# Patient Record
Sex: MALE | Race: OTHER | Employment: UNEMPLOYED | URBAN - METROPOLITAN AREA
[De-identification: names, ages, dates, MRNs, and addresses within clinical notes are randomized per-mention and may not be internally consistent; named-entity substitution may affect disease eponyms.]

---

## 2018-01-30 ENCOUNTER — APPOINTMENT (OUTPATIENT)
Dept: GENERAL RADIOLOGY | Age: 33
End: 2018-01-30
Attending: EMERGENCY MEDICINE
Payer: SELF-PAY

## 2018-01-30 ENCOUNTER — HOSPITAL ENCOUNTER (EMERGENCY)
Age: 33
Discharge: HOME | End: 2018-01-30
Attending: EMERGENCY MEDICINE
Payer: SELF-PAY

## 2018-01-30 VITALS
TEMPERATURE: 98.2 F | HEART RATE: 70 BPM | DIASTOLIC BLOOD PRESSURE: 80 MMHG | SYSTOLIC BLOOD PRESSURE: 134 MMHG | OXYGEN SATURATION: 98 % | WEIGHT: 160 LBS | RESPIRATION RATE: 18 BRPM

## 2018-01-30 DIAGNOSIS — R05.9 COUGH: Primary | ICD-10-CM

## 2018-01-30 LAB
FLUAV AG NPH QL IA: NEGATIVE
FLUBV AG NPH QL IA: NEGATIVE

## 2018-01-30 PROCEDURE — 87804 INFLUENZA ASSAY W/OPTIC: CPT | Performed by: EMERGENCY MEDICINE

## 2018-01-30 PROCEDURE — 99283 EMERGENCY DEPT VISIT LOW MDM: CPT | Performed by: EMERGENCY MEDICINE

## 2018-01-30 PROCEDURE — 71046 X-RAY EXAM CHEST 2 VIEWS: CPT

## 2018-01-30 RX ORDER — DOXYCYCLINE HYCLATE 100 MG
100 TABLET ORAL 2 TIMES DAILY
Qty: 20 TAB | Refills: 0 | Status: SHIPPED | OUTPATIENT
Start: 2018-01-30 | End: 2018-01-30

## 2018-01-30 RX ORDER — DOXYCYCLINE HYCLATE 100 MG
100 TABLET ORAL 2 TIMES DAILY
Qty: 20 TAB | Refills: 0 | Status: SHIPPED | OUTPATIENT
Start: 2018-01-30 | End: 2018-02-09

## 2018-01-31 NOTE — DISCHARGE INSTRUCTIONS
Tos: Instrucciones de cuidado - [ Cough: Care Instructions ]  Instrucciones de cuidado    La tos es [place] de alfred cuerpo a algo que molesta en la garganta o las vías respiratorias. La tos puede ser provocada por muchas cosas. Usted podría toser debido a un resfriado o madalyn gripe, madalyn bronquitis o el asma. Fumar, el goteo posnasal, las alergias y el ácido estomacal que regresa a alfred garganta también pueden causar tos. La tos es un síntoma, no madalyn enfermedad. En la [employer], la tos cesa cuando desaparece la causa, dylan un resfriado. Puede ladarius algunas medidas en alfred hogar para toser menos y sentirse mejor. La atención de seguimiento es madalyn parte clave de alfred tratamiento y seguridad. Asegúrese de hacer y acudir a todas las citas, y llame a alfred médico si está teniendo problemas. También es madalyn buena idea saber los resultados de los exámenes y mantener madalyn lista de los medicamentos que thomas. ¿Cómo puede cuidarse en el hogar? · Nunu abundante agua y otros líquidos. [place] ayuda a [place] mucosidad sea menos espesa y [place] la garganta seca o adolorida. La miel o el jugo de aftab en [place] o té podrían aliviar madalyn tos seca. · [employer] medicamentos para la tos según las indicaciones de alfred médico.  · Eleve la yobany sobre almohadas para ayudarle a respirar y aliviar la tos seca. · Pruebe pastillas para la tos para aliviar la garganta seca o adolorida. Las pastillas para la tos no detienen la tos. Las pastillas para la tos medicinales saborizadas no son mejores que las pastillas con sabor a emperatriz o los caramelos duros. · No fume. Evite el humo de tabaco ambiental. Si necesita ayuda para dejar de fumar, hable con alfred médico sobre programas y medicamentos para dejar de fumar. Estos pueden aumentar thu probabilidades de dejar el hábito para siempre. ¿Cuándo debe pedir ayuda? Llame al 911 en cualquier momento que considere que necesita atención de [place]. Por ejemplo, llame si:  ?  · Tiene graves dificultades para respirar. ? Llame a white médico ahora mismo o busque atención médica inmediata si:  ? · Tose danielle. ? · Tiene nuevas dificultades para respirar o Baudilio Mom. ? · Tiene fiebre nueva o más marilynn. ? · Tiene un salpullido nuevo. ?Preste especial atención a los cambios en white [place] y asegúrese de comunicarse con white médico si:  ? · White tos es más profunda o más frecuente que antes, especialmente si nota más mucosidad o un cambio en el color de la mucosidad. ? · Tiene nuevos síntomas, dylan dolor de garganta, dolor de oídos o dolor en los senos paranasales. ? · No mejora dylan se esperaba. ¿Dónde puede encontrar más información en inglés? Halima Hays a [email or web address] Escriba D279 en la búsqueda para aprender más acerca de \"Tos: Instrucciones de cuidado - [ Cough: Care Instructions ]. \"  Revisado: 12 gil, 2017  Versión del contenido: 11.4  © 9274-4927 Healthwise, Incorporated. Las instrucciones de cuidado fueron adaptadas bajo licencia por [employer] (which disclaims liability or warranty for this information). Si usted tiene [place] afección médica o sobre estas instrucciones, siempre pregunte a white profesional de [place]. Healthwise, Incorporated niega toda garantía o responsabilidad por white uso de esta información. Doxiciclina (Por la boca)   Se [place] para tratar y [address] infecciones. También se [place] para prevenir la malaria y tratar la rosácea o el acné grave. Pertenece a la clase de antibióticos derivados de la tetraciclina. [place]elia(s) : Acticlate, Adoxa, Adoxa Suleiman 1/150, Avidoxy, Avidoxy DK, BenzoDox 30 Kit, BenzoDox 60 Kit, Doryx, Doryx MPC, Monodox, Morgidox [id number], Morgidox [id number] Kit, Morgidox 1x50MG, Morgidox 1x50MG Kit, Morgidox [id number]   Existen muchas otras [address]. Jaida medicamento no debe ser usado cuando:   Jaida medicamento no es adecuado para todas las personas.  No lo use si ha tenido madalyn reacción alérgica a la doxiciclina o a otro antibiótico derivado de la tetraciclina, si está embarazada o dando de lactar. [place] de usar jaida medicamento:   [facility] de liberación [place], [place] de liberación prolongada, [place], [place], Tableta de liberación programada  · Alfred médico le indicara cuanto medicamento necesita usar. No use más medicamento de lo indicado. · Pregunte a alfred médico o farmacéutico si usted debe ladarius [employer] [place] [employer] con o sin alimentos. Algunas de las formas que se presenta jaida medicamento pueden tomarse con alimentos o con [place], lauren otras formas deben tomarse con el estómago vacío. · Cápsulas Oracea®: Jaida medicamento debe tomarse con el estómago vacío, por lo menos 1 hora antes o 2 horas después de la comida. · Cápsula: Tráguela entera. No la quiebre, triture, mastique ni alla. · Tabletas de liberación prolongada: También puede ladarius jaida medicamento Brijesh trozos de la tableta con puré de manzana a temperatura ambiente Ingiera la mezcla inmediatamente. No la mastique. No la guarde para usarla más adelante. Puede ladarius esta medicina con comida o leche para evitar el malestar estomacal.  · Solución oral: Agite el envase dania antes de cada uso. Mida el líquido oral con [place], [place] para uso oral o taza especialmente marcadas para medir medicamentos. · Tabletas: Usted puede ladarius las tabletas con comida o con leche para evitar la irritación estomacal. Para partir madalyn tableta, tómela con el pulgar y [facility] cerca de la [place]. Luego, aplique suficiente presión dylan para quebrar la [employer]. No use la tableta si no se quiebra en la [place]. · [place] todo alfred medicamento recetado para eliminar alfred infección por completo aunque usted se sienta mejor después de las primeras dosis. · Si usted thomas la cápsula o tableta, ingiera suficiente líquido para evitar problemas de garganta.   · Prevención de la malaria: Comience a ladarius jaida medicamento 1 o 2 walter antes de salir de viaje. [employer] todos los días anisa el viaje. Continúe tomándolo anisa 4 semanas después de regresar. No use, sin embargo, connor medicamento por más de 4 meses. · No tome connor medicamento por más de 9 meses si lo está usando para tratar la rosácea. · Use sólo la pearl del medicamento que el médico le ha prescrito. Es posible que otras marcas no tengan el mismo [place]. · [place] y siga las instrucciones para el paciente que vienen con el medicamento. Hable con alfred médico o farmacéutico si tiene alguna pregunta. · Si olvida madalyn dosis: Si olvida madalyn dosis de alfred medicamento, tómelo lo más pronto posible. Si es irene la hora para alfred próxima dosis, espere hasta entonces para ladarius alfred dosis regular. No use medicamento adicional para reponer la dosis olvidada. · Guarde el medicamento en un recipiente cerrado a temperatura ambiente y alejado del calor, la humedad y la nayan directa. No congele la solución oral.  Medicamentos y alimentos que debe evitar:   Consulte con alfred médico o farmacéutico antes de usar cualquier otro medicamento, incluidos los de [place], las vitaminas y los productos herbales. · Algunos medicamento pueden afectar la eficacia de la doxiciclina.  Informe a alfred médico si está usando cualquiera de los siguientes:  ¨ Subsalicilato de bismuto  ¨ Medicamentos para el acné (incluyendo isotretinoína)  ¨ Las pastillas anticonceptivas  ¨ Anticoagulantes (incluyendo warfarina)  ¨ Medicamentos para las convulsiones (incluyendo [place], [place], fenitoína)  ¨ Medicamentos que contengan aluminio, calcio o anita (incluyendo un antiácido o suplementos vitamínicos)  ¨ Medicamentos para tratar la psoriasis (incluyendo acitretina)  ¨ Antibióticos de penicilina  ¨ Medicamentos para el estómago  Precauciones anisa el uso de connor medicamento:   · Connor medicamento puede causar malformaciones congénitas en el bebé si anisa la taryn o el embarazo alguno de [place] usando Dez. Infórmele a alfred médico de inmediato si alfred mir o usted [place] Es posible que las píldoras anticonceptivas no tengan la misma eficacia cuando se utilizan junto con Dueñas. Use un jenna método anticonceptivo para evitar el embarazo. · Informe a alfred médico si tiene madalyn enfermedad renal o hepática, asma o alergia a los sulfitos. Informe a alfred médico si ha tenido [place] and [place] o si tiene antecedentes de infecciones por hongos. · Connor medicamento puede causar los siguientes problemas:  ¨ Cambio permanente del color de los dientes (en niños menores de 8 años)  ¨ Aumento de presión dentro de la yobany  ¨ Infección por hongos  ¨ Problemas del sistema inmunitario  · Connor medicamento puede causar diarrea. Llame a alfred médico si la diarrea se intensifica, no se detiene, o contiene danielle. No tome ningún medicamento para suspender la diarrea hasta que hable con alfred médico. La diarrea puede ocurrir 2 meces o más después de suspender el uso de Dueñas. · Connor medicamento puede hacer que alfred piel se vuelva más sensible a la nayan solar. Use protector solar. No use lámparas ni cámaras bronceadoras. · Dígale a todo médico o dentista encargado de atenderle que usted está usando Dueñas. Puede que connor medicamento afecte algunos resultados de [place]. · Llame a alfred médico si thu síntomas no mejoran, o si Alannaylyfaustino Silva. · El médico solicitará exámenes de laboratorio anisa las citas de rutina para revisar los efectos de Dez. Asista a todas thu citas. · Guarde todos los medicamentos fuera del alcance de los niños. Nunca comparta thu medicamentos con [facility].   Efectos secundarios que pueden presentarse anisa el uso de connor medicamento:   Consulte inmediatamente con el médico si nota cualquiera de estos efectos secundarios:  · Reacción alérgica: Comezón o ronchas, hinchazón del john o las patricio, hinchazón u hormigueo en la boca o garganta, opresión en el pecho, dificultad para respirar  · Ampollas, despellejamiento, sarpullido kaplan en la piel. · Ardor, dolor o irritación en la parte superior del estómago o la garganta  · Diarrea con posible presencia de danielle  · Adan Sax, tos, flujo o congestión nasal, dolor de garganta, aamir de cuerpo  · [employer], fiebre, salpullido y cansancio o debilidad inusuales  · Dolor de yobany intenso, mareos o cambios en la vista  · Dolor de estómago súbito e intenso, náuseas, vómito, desvanecimientos  Consulte con el médico si nota los siguientes efectos secundarios menos graves:   · Oscurecimiento de la piel, las cicatrices, los dientes o las encías  · Ampollas o manchas flaco en la boca, labios o garganta  Consulte con el médico si nota otros efectos secundarios que jesus son causados por connor medicamento. Llame a alfred médico para consultarle Sunil. Usted puede notificar thu efectos secundarios al FDA al [phone]. © 2017 [address]  Information is for End User's use only and may not be sold, redistributed or otherwise used for commercial purposes. Esta información es sólo para uso en educación. Alfred intención no es darle un consejo médico sobre enfermedades o tratamientos. Colsulte con alfred [place] Stagers farmacéutico antes de seguir cualquier régimen médico para saber si es seguro y efectivo para usted.

## 2018-01-31 NOTE — ED PROVIDER NOTES
HPI Comments: Patient is a 28 yo male with cough. Patient states cough for about 1 week with runny nose, congestion and sore throat. States cough non-productive. States fevers initially which have improved the past couple days. NO chest pain, no SOB, no abdominal pain, no nausea or vomiting, no further complaints. Overall patient very well appearing, NAD. Patient is a 29 y.o. male presenting with cough. The history is provided by the patient. The history is limited by a language barrier. A  was used. Cough   Pertinent negatives include no chest pain, no chills, no headaches, no rhinorrhea, no sore throat, no shortness of breath, no nausea and no vomiting. No past medical history on file. No past surgical history on file. No family history on file. Social History     Social History    Marital status:      Spouse name: N/A    Number of children: N/A    Years of education: N/A     Occupational History    Not on file. Social History Main Topics    Smoking status: Not on file    Smokeless tobacco: Not on file    Alcohol use Not on file    Drug use: Not on file    Sexual activity: Not on file     Other Topics Concern    Not on file     Social History Narrative         ALLERGIES: Review of patient's allergies indicates no known allergies. Review of Systems   Constitutional: Negative for chills and fever. HENT: Negative for rhinorrhea and sore throat. Eyes: Negative for visual disturbance. Respiratory: Positive for cough. Negative for shortness of breath. Cardiovascular: Negative for chest pain and leg swelling. Gastrointestinal: Negative for abdominal pain, diarrhea, nausea and vomiting. Genitourinary: Negative for dysuria. Musculoskeletal: Negative for back pain and neck pain. Skin: Negative for rash. Neurological: Negative for weakness and headaches. Psychiatric/Behavioral: The patient is not nervous/anxious.         Vitals: 01/30/18 1902   BP: 133/82   Pulse: 68   Resp: 18   Temp: 98.4 °F (36.9 °C)   SpO2: 98%   Weight: 72.6 kg (160 lb)            Physical Exam   Constitutional: He is oriented to person, place, and time. He appears well-developed and well-nourished. HENT:   Head: Normocephalic. Right Ear: External ear normal.   Left Ear: External ear normal.   Eyes: Conjunctivae and EOM are normal. Pupils are equal, round, and reactive to light. Neck: Normal range of motion. Neck supple. No tracheal deviation present. Cardiovascular: Normal rate, regular rhythm, normal heart sounds and intact distal pulses. No murmur heard. Pulmonary/Chest: Effort normal and breath sounds normal. No respiratory distress. He has no wheezes. He has no rales. Abdominal: Soft. He exhibits no distension. There is no tenderness. There is no rebound. Musculoskeletal: Normal range of motion. Neurological: He is alert and oriented to person, place, and time. No cranial nerve deficit. Skin: No rash noted. Nursing note and vitals reviewed. MDM  Number of Diagnoses or Management Options  Cough: new and requires workup     Amount and/or Complexity of Data Reviewed  Clinical lab tests: ordered and reviewed  Tests in the radiology section of CPT®: ordered and reviewed    Risk of Complications, Morbidity, and/or Mortality  Presenting problems: moderate  Diagnostic procedures: moderate  Management options: moderate    Patient Progress  Patient progress: stable        ED Course       Procedures     Recent Results (from the past 12 hour(s))   INFLUENZA A & B AG (RAPID TEST)    Collection Time: 01/30/18  7:10 PM   Result Value Ref Range    Influenza A Ag NEGATIVE  NEG      Influenza B Ag NEGATIVE  NEG       Xr Chest Pa Lat    Result Date: 1/30/2018  PA LATERAL CHEST X-RAY HISTORY: One week of cough and back pain COMPARISON: None FINDINGS: The heart size is mildly enlarged. There is no consolidation, pleural effusions, or pulmonary edema. IMPRESSION: No consolidation. 28 yo male with cough:    Given duration will treat as bronchitis with doxycycline. Patient to follow up with PCP in 1-2 days or return immediately with any worsening cough, any SOB, any fevers or chills, nausea or vomiting, chest pain or any further concerns.

## 2018-01-31 NOTE — ED NOTES
I have reviewed discharge instructions with the patient. The patient verbalized understanding. Patient left ED via ambulatory/POV to home with family. Opportunity for questions and clarification provided. Patient given one scripts. To continue your aftercare when you leave the hospital, you may receive an automated call from our care team to check in on how you are doing. This is a free service and part of our promise to provide the best care and service to meet your aftercare needs.  If you have questions, or wish to unsubscribe from this service please call [phone]. Thank you for Choosing our [facility] Emergency Department.